# Patient Record
Sex: FEMALE | Race: ASIAN | NOT HISPANIC OR LATINO | URBAN - METROPOLITAN AREA
[De-identification: names, ages, dates, MRNs, and addresses within clinical notes are randomized per-mention and may not be internally consistent; named-entity substitution may affect disease eponyms.]

---

## 2024-08-10 ENCOUNTER — EMERGENCY (EMERGENCY)
Facility: HOSPITAL | Age: 29
LOS: 1 days | Discharge: HOME | End: 2024-08-10
Attending: EMERGENCY MEDICINE | Admitting: EMERGENCY MEDICINE
Payer: COMMERCIAL

## 2024-08-10 VITALS
TEMPERATURE: 98 F | RESPIRATION RATE: 18 BRPM | DIASTOLIC BLOOD PRESSURE: 68 MMHG | SYSTOLIC BLOOD PRESSURE: 105 MMHG | HEART RATE: 88 BPM | HEIGHT: 66 IN | WEIGHT: 116.84 LBS | OXYGEN SATURATION: 100 %

## 2024-08-10 PROCEDURE — 99284 EMERGENCY DEPT VISIT MOD MDM: CPT

## 2024-08-10 NOTE — ED ADULT TRIAGE NOTE - CHIEF COMPLAINT QUOTE
c/o itchy rash on face spreading to neck and chest x 1 day. Denies new detergent or body/face wash. No respiratory distress noted. Denies pmhx

## 2024-08-11 VITALS
HEART RATE: 68 BPM | RESPIRATION RATE: 18 BRPM | OXYGEN SATURATION: 100 % | DIASTOLIC BLOOD PRESSURE: 63 MMHG | TEMPERATURE: 98 F | SYSTOLIC BLOOD PRESSURE: 105 MMHG

## 2024-08-11 LAB
ALBUMIN SERPL ELPH-MCNC: 4.2 G/DL — SIGNIFICANT CHANGE UP (ref 3.3–5)
ALP SERPL-CCNC: 38 U/L — LOW (ref 40–120)
ALT FLD-CCNC: 13 U/L — SIGNIFICANT CHANGE UP (ref 4–33)
ANION GAP SERPL CALC-SCNC: 12 MMOL/L — SIGNIFICANT CHANGE UP (ref 7–14)
AST SERPL-CCNC: 15 U/L — SIGNIFICANT CHANGE UP (ref 4–32)
BASOPHILS # BLD AUTO: 0.06 K/UL — SIGNIFICANT CHANGE UP (ref 0–0.2)
BASOPHILS NFR BLD AUTO: 0.5 % — SIGNIFICANT CHANGE UP (ref 0–2)
BILIRUB SERPL-MCNC: 0.6 MG/DL — SIGNIFICANT CHANGE UP (ref 0.2–1.2)
BUN SERPL-MCNC: 12 MG/DL — SIGNIFICANT CHANGE UP (ref 7–23)
CALCIUM SERPL-MCNC: 8.9 MG/DL — SIGNIFICANT CHANGE UP (ref 8.4–10.5)
CHLORIDE SERPL-SCNC: 101 MMOL/L — SIGNIFICANT CHANGE UP (ref 98–107)
CO2 SERPL-SCNC: 25 MMOL/L — SIGNIFICANT CHANGE UP (ref 22–31)
CREAT SERPL-MCNC: 0.87 MG/DL — SIGNIFICANT CHANGE UP (ref 0.5–1.3)
CRP SERPL-MCNC: <3 MG/L — SIGNIFICANT CHANGE UP
EGFR: 92 ML/MIN/1.73M2 — SIGNIFICANT CHANGE UP
EOSINOPHIL # BLD AUTO: 1.08 K/UL — HIGH (ref 0–0.5)
EOSINOPHIL NFR BLD AUTO: 9.6 % — HIGH (ref 0–6)
GLUCOSE SERPL-MCNC: 103 MG/DL — HIGH (ref 70–99)
HCG SERPL-ACNC: <1 MIU/ML — SIGNIFICANT CHANGE UP
HCT VFR BLD CALC: 35.9 % — SIGNIFICANT CHANGE UP (ref 34.5–45)
HGB BLD-MCNC: 12.3 G/DL — SIGNIFICANT CHANGE UP (ref 11.5–15.5)
IANC: 6.7 K/UL — SIGNIFICANT CHANGE UP (ref 1.8–7.4)
IMM GRANULOCYTES NFR BLD AUTO: 0.4 % — SIGNIFICANT CHANGE UP (ref 0–0.9)
LYMPHOCYTES # BLD AUTO: 2.38 K/UL — SIGNIFICANT CHANGE UP (ref 1–3.3)
LYMPHOCYTES # BLD AUTO: 21.2 % — SIGNIFICANT CHANGE UP (ref 13–44)
MAGNESIUM SERPL-MCNC: 2 MG/DL — SIGNIFICANT CHANGE UP (ref 1.6–2.6)
MCHC RBC-ENTMCNC: 28.6 PG — SIGNIFICANT CHANGE UP (ref 27–34)
MCHC RBC-ENTMCNC: 34.3 GM/DL — SIGNIFICANT CHANGE UP (ref 32–36)
MCV RBC AUTO: 83.5 FL — SIGNIFICANT CHANGE UP (ref 80–100)
MONOCYTES # BLD AUTO: 0.95 K/UL — HIGH (ref 0–0.9)
MONOCYTES NFR BLD AUTO: 8.5 % — SIGNIFICANT CHANGE UP (ref 2–14)
NEUTROPHILS # BLD AUTO: 6.7 K/UL — SIGNIFICANT CHANGE UP (ref 1.8–7.4)
NEUTROPHILS NFR BLD AUTO: 59.8 % — SIGNIFICANT CHANGE UP (ref 43–77)
NRBC # BLD: 0 /100 WBCS — SIGNIFICANT CHANGE UP (ref 0–0)
NRBC # FLD: 0 K/UL — SIGNIFICANT CHANGE UP (ref 0–0)
PLATELET # BLD AUTO: 355 K/UL — SIGNIFICANT CHANGE UP (ref 150–400)
POTASSIUM SERPL-MCNC: 3.9 MMOL/L — SIGNIFICANT CHANGE UP (ref 3.5–5.3)
POTASSIUM SERPL-SCNC: 3.9 MMOL/L — SIGNIFICANT CHANGE UP (ref 3.5–5.3)
PROT SERPL-MCNC: 7.5 G/DL — SIGNIFICANT CHANGE UP (ref 6–8.3)
RBC # BLD: 4.3 M/UL — SIGNIFICANT CHANGE UP (ref 3.8–5.2)
RBC # FLD: 13.7 % — SIGNIFICANT CHANGE UP (ref 10.3–14.5)
SODIUM SERPL-SCNC: 138 MMOL/L — SIGNIFICANT CHANGE UP (ref 135–145)
WBC # BLD: 11.21 K/UL — HIGH (ref 3.8–10.5)
WBC # FLD AUTO: 11.21 K/UL — HIGH (ref 3.8–10.5)

## 2024-08-11 PROCEDURE — 70487 CT MAXILLOFACIAL W/DYE: CPT | Mod: 26,MC

## 2024-08-11 RX ORDER — FAMOTIDINE 40 MG/1
20 TABLET, FILM COATED ORAL ONCE
Refills: 0 | Status: COMPLETED | OUTPATIENT
Start: 2024-08-11 | End: 2024-08-11

## 2024-08-11 RX ORDER — DIPHENHYDRAMINE HCL 25 MG
25 CAPSULE ORAL ONCE
Refills: 0 | Status: COMPLETED | OUTPATIENT
Start: 2024-08-11 | End: 2024-08-11

## 2024-08-11 RX ORDER — AMPICILLIN SOD/SULBACTAM SOD 3 G
3 VIAL (EA) INJECTION ONCE
Refills: 0 | Status: COMPLETED | OUTPATIENT
Start: 2024-08-11 | End: 2024-08-11

## 2024-08-11 RX ADMIN — FAMOTIDINE 20 MILLIGRAM(S): 40 TABLET, FILM COATED ORAL at 01:26

## 2024-08-11 RX ADMIN — Medication 25 MILLIGRAM(S): at 01:26

## 2024-08-11 RX ADMIN — Medication 200 GRAM(S): at 01:07

## 2024-08-11 RX ADMIN — Medication 25 MILLIGRAM(S): at 03:39

## 2024-08-11 NOTE — ED PROVIDER NOTE - NSFOLLOWUPINSTRUCTIONS_ED_ALL_ED_FT
Today in the emergency department your evaluated for an infection versus an allergic reaction in your case.  We did a CT scan that did not show any significant infection.  You likely have an allergic reaction.  Please follow-up with an allergist should you continue to have symptoms.    Please follow up with your primary care physician within 1-2 weeks of discharge from the emergency department.  Please bring a copy of your results with you.  Please return to the emergency department for worsening of your symptoms.    You may take Acetaminophen over the counter as needed for pain and/or fever. Use as directed and see medication warnings.  You may take Ibuprofen over the counter as needed for pain and/or fever. Use as directed and see medication warnings.

## 2024-08-11 NOTE — ED PROVIDER NOTE - OBJECTIVE STATEMENT
29-year-old female without significant past medical history presenting with facial swelling, most significant in right eye over the last 24 hours.  Patient was seen at urgent care and given Benadryl without significant relief.  She is concerned that the swelling has gotten worse around her eyes.  She otherwise has no significant headache, chest pain, shortness of breath, N/V/D/abdominal pain, peripheral edema, fever/chills.  NKDA, +food allergies

## 2024-08-11 NOTE — ED PROVIDER NOTE - ATTENDING CONTRIBUTION TO CARE
Seen and examined, discussed with resident, 2-day history of facial redness and itching which began as a bump on forehead.  Initially Benadryl improved but since then has worsened and spread, now involving right eye more than left, spreading to itchy bumps on neck and left arm.  Patient seen in other ED and received oral Benadryl and steroid, continue to take at home but worsened tonight.  Has history of dust allergy and food allergy as a child.  No severe allergic reactions or EpiPen use in the past.  Has asthma history without current cough, chest, or SOB complaints.  No fever/chills, has some folliculitis and facial acne without significant previous infection complaints.  Raised reddened itchy wheals to face especially around right eye and inferior left eye, sclera clear, no discharge, EOMI, no diplopia, minimal pain with eye movement to right gaze, normal pharynx, clear lungs.

## 2024-08-11 NOTE — ED ADULT NURSE REASSESSMENT NOTE - NSFALLUNIVINTERV_ED_ALL_ED
Bed/Stretcher in lowest position, wheels locked, appropriate side rails in place/Call bell, personal items and telephone in reach/Instruct patient to call for assistance before getting out of bed/chair/stretcher/Non-slip footwear applied when patient is off stretcher/[place] to call system/Physically safe environment - no spills, clutter or unnecessary equipment/Purposeful proactive rounding/Room/bathroom lighting operational, light cord in reach

## 2024-08-11 NOTE — ED PROVIDER NOTE - CLINICAL SUMMARY MEDICAL DECISION MAKING FREE TEXT BOX
29-year-old female presenting with facial swelling, significant around right eye.  Concern for periorbital cellulitis versus allergic reaction.  Labs, EKG, CT max face ordered.

## 2024-08-11 NOTE — ED ADULT NURSE REASSESSMENT NOTE - NS ED NURSE REASSESS COMMENT FT1
Pt received from BREAK RN on stretcher, A/Ox4 answers all questions appropriately. Pt denies chest pain and SOB during reassessment, breathing is even and unlabored on room air. Abdomen is soft and non-distended. Pt ambulates independently at baseline, moves all four extremities on command, skin is intact. Pt resting comfortably at the bedside, No apparent acute visible distress noted on reassessment. Vital signs stable, NKA to medications. Pending CT results

## 2024-08-11 NOTE — ED PROVIDER NOTE - PROGRESS NOTE DETAILS
Lab work notable for WBC 11.21, otherwise unremarkable.  CT max face without evidence of periorbital cellulitis.  Dispo to home with PMD follow-up.

## 2024-08-11 NOTE — ED PROVIDER NOTE - PHYSICAL EXAMINATION
GENERAL: NAD  HEAD: normocephalic, atraumatic  HEENT: normal conjunctiva, oral mucosa moist  CARDIAC: well perfused  PULM: speaking in full sentences  GI: abdomen nondistended, soft, nontender  : no suprapubic tenderness  NEURO: moving all 4 extremities, Pain with EOMI, EOMI intact, PERRLA  MSK: no peripheral edema  SKIN: extremities warm, Facial swelling, erythematous rash across face, neck, bilateral UE,

## 2024-08-11 NOTE — ED PROVIDER NOTE - PATIENT PORTAL LINK FT
You can access the FollowMyHealth Patient Portal offered by [facility] by registering at the following website: [email or web address]. By joining [employer]’s FollowMyHealth portal, you will also be able to view your health information using other applications (apps) compatible with our system.

## 2024-08-11 NOTE — ED ADULT NURSE NOTE - OBJECTIVE STATEMENT
Break RN: 28yo female received in room 1. pt A&OX4, denies pmhx, c/o itchy rash and swelling on face spreading to neck x 1 day. No respiratory distress noted. 20G IV placed to [facility], lab drawn and sent. Side rails up, bed at lowest position, call bell within reach, patient oriented to the unit, safety maintained. Pending CT scan

## 2025-02-26 NOTE — ED ADULT TRIAGE NOTE - HEIGHT IN INCHES
Notified, understanding verbalized  ----- Message from Rodo Avendaño MD sent at 2/5/2025  6:31 PM CST -----  Let patient know:  Phosphorus is low again. Does he have any supplementation at home? If so, what does he have?    Uric acid just a little high. Can monitor    Vit d level low. Take 800-100 units Vit D daily.  \  Creatinine bumped a little. Increase fluid intake. Come by in 2-3 weeks for non fasting repeat.       6

## 2025-06-11 ENCOUNTER — EMERGENCY (EMERGENCY)
Facility: HOSPITAL | Age: 30
LOS: 1 days | End: 2025-06-11
Admitting: STUDENT IN AN ORGANIZED HEALTH CARE EDUCATION/TRAINING PROGRAM
Payer: COMMERCIAL

## 2025-06-11 VITALS
SYSTOLIC BLOOD PRESSURE: 120 MMHG | OXYGEN SATURATION: 99 % | HEART RATE: 74 BPM | DIASTOLIC BLOOD PRESSURE: 77 MMHG | TEMPERATURE: 98 F | WEIGHT: 111.99 LBS | RESPIRATION RATE: 16 BRPM

## 2025-06-11 VITALS
OXYGEN SATURATION: 100 % | DIASTOLIC BLOOD PRESSURE: 58 MMHG | TEMPERATURE: 98 F | HEART RATE: 77 BPM | SYSTOLIC BLOOD PRESSURE: 100 MMHG | RESPIRATION RATE: 18 BRPM

## 2025-06-11 LAB
ALBUMIN SERPL ELPH-MCNC: 4.6 G/DL — SIGNIFICANT CHANGE UP (ref 3.3–5)
ALP SERPL-CCNC: 32 U/L — LOW (ref 40–120)
ALT FLD-CCNC: 13 U/L — SIGNIFICANT CHANGE UP (ref 4–33)
ANION GAP SERPL CALC-SCNC: 10 MMOL/L — SIGNIFICANT CHANGE UP (ref 7–14)
APPEARANCE UR: CLEAR — SIGNIFICANT CHANGE UP
AST SERPL-CCNC: 16 U/L — SIGNIFICANT CHANGE UP (ref 4–32)
BASOPHILS # BLD AUTO: 0.07 K/UL — SIGNIFICANT CHANGE UP (ref 0–0.2)
BASOPHILS NFR BLD AUTO: 0.9 % — SIGNIFICANT CHANGE UP (ref 0–2)
BILIRUB SERPL-MCNC: 1.1 MG/DL — SIGNIFICANT CHANGE UP (ref 0.2–1.2)
BILIRUB UR-MCNC: NEGATIVE — SIGNIFICANT CHANGE UP
BLD GP AB SCN SERPL QL: NEGATIVE — SIGNIFICANT CHANGE UP
BUN SERPL-MCNC: 10 MG/DL — SIGNIFICANT CHANGE UP (ref 7–23)
CALCIUM SERPL-MCNC: 9.1 MG/DL — SIGNIFICANT CHANGE UP (ref 8.4–10.5)
CHLORIDE SERPL-SCNC: 102 MMOL/L — SIGNIFICANT CHANGE UP (ref 98–107)
CO2 SERPL-SCNC: 24 MMOL/L — SIGNIFICANT CHANGE UP (ref 22–31)
COLOR SPEC: YELLOW — SIGNIFICANT CHANGE UP
CREAT SERPL-MCNC: 0.53 MG/DL — SIGNIFICANT CHANGE UP (ref 0.5–1.3)
DIFF PNL FLD: NEGATIVE — SIGNIFICANT CHANGE UP
EGFR: 128 ML/MIN/1.73M2 — SIGNIFICANT CHANGE UP
EGFR: 128 ML/MIN/1.73M2 — SIGNIFICANT CHANGE UP
EOSINOPHIL # BLD AUTO: 0.14 K/UL — SIGNIFICANT CHANGE UP (ref 0–0.5)
EOSINOPHIL NFR BLD AUTO: 1.9 % — SIGNIFICANT CHANGE UP (ref 0–6)
GLUCOSE SERPL-MCNC: 94 MG/DL — SIGNIFICANT CHANGE UP (ref 70–99)
GLUCOSE UR QL: NEGATIVE MG/DL — SIGNIFICANT CHANGE UP
HCG SERPL-ACNC: 1002 MIU/ML — SIGNIFICANT CHANGE UP
HCT VFR BLD CALC: 37.4 % — SIGNIFICANT CHANGE UP (ref 34.5–45)
HGB BLD-MCNC: 12.3 G/DL — SIGNIFICANT CHANGE UP (ref 11.5–15.5)
IANC: 5.11 K/UL — SIGNIFICANT CHANGE UP (ref 1.8–7.4)
IMM GRANULOCYTES NFR BLD AUTO: 0.3 % — SIGNIFICANT CHANGE UP (ref 0–0.9)
KETONES UR QL: NEGATIVE MG/DL — SIGNIFICANT CHANGE UP
LEUKOCYTE ESTERASE UR-ACNC: NEGATIVE — SIGNIFICANT CHANGE UP
LYMPHOCYTES # BLD AUTO: 1.61 K/UL — SIGNIFICANT CHANGE UP (ref 1–3.3)
LYMPHOCYTES # BLD AUTO: 21.4 % — SIGNIFICANT CHANGE UP (ref 13–44)
MCHC RBC-ENTMCNC: 28.1 PG — SIGNIFICANT CHANGE UP (ref 27–34)
MCHC RBC-ENTMCNC: 32.9 G/DL — SIGNIFICANT CHANGE UP (ref 32–36)
MCV RBC AUTO: 85.4 FL — SIGNIFICANT CHANGE UP (ref 80–100)
MONOCYTES # BLD AUTO: 0.58 K/UL — SIGNIFICANT CHANGE UP (ref 0–0.9)
MONOCYTES NFR BLD AUTO: 7.7 % — SIGNIFICANT CHANGE UP (ref 2–14)
NEUTROPHILS # BLD AUTO: 5.11 K/UL — SIGNIFICANT CHANGE UP (ref 1.8–7.4)
NEUTROPHILS NFR BLD AUTO: 67.8 % — SIGNIFICANT CHANGE UP (ref 43–77)
NITRITE UR-MCNC: NEGATIVE — SIGNIFICANT CHANGE UP
NRBC # BLD AUTO: 0 K/UL — SIGNIFICANT CHANGE UP (ref 0–0)
NRBC # FLD: 0 K/UL — SIGNIFICANT CHANGE UP (ref 0–0)
NRBC BLD AUTO-RTO: 0 /100 WBCS — SIGNIFICANT CHANGE UP (ref 0–0)
PH UR: 6.5 — SIGNIFICANT CHANGE UP (ref 5–8)
PLATELET # BLD AUTO: 375 K/UL — SIGNIFICANT CHANGE UP (ref 150–400)
POTASSIUM SERPL-MCNC: 3.6 MMOL/L — SIGNIFICANT CHANGE UP (ref 3.5–5.3)
POTASSIUM SERPL-SCNC: 3.6 MMOL/L — SIGNIFICANT CHANGE UP (ref 3.5–5.3)
PROT SERPL-MCNC: 7.5 G/DL — SIGNIFICANT CHANGE UP (ref 6–8.3)
PROT UR-MCNC: NEGATIVE MG/DL — SIGNIFICANT CHANGE UP
RBC # BLD: 4.38 M/UL — SIGNIFICANT CHANGE UP (ref 3.8–5.2)
RBC # FLD: 14 % — SIGNIFICANT CHANGE UP (ref 10.3–14.5)
RH IG SCN BLD-IMP: POSITIVE — SIGNIFICANT CHANGE UP
SODIUM SERPL-SCNC: 136 MMOL/L — SIGNIFICANT CHANGE UP (ref 135–145)
SP GR SPEC: 1.01 — SIGNIFICANT CHANGE UP (ref 1–1.03)
UROBILINOGEN FLD QL: 0.2 MG/DL — SIGNIFICANT CHANGE UP (ref 0.2–1)
WBC # BLD: 7.53 K/UL — SIGNIFICANT CHANGE UP (ref 3.8–10.5)
WBC # FLD AUTO: 7.53 K/UL — SIGNIFICANT CHANGE UP (ref 3.8–10.5)

## 2025-06-11 PROCEDURE — 76817 TRANSVAGINAL US OBSTETRIC: CPT | Mod: 26

## 2025-06-11 PROCEDURE — 99285 EMERGENCY DEPT VISIT HI MDM: CPT

## 2025-06-11 NOTE — ED PROVIDER NOTE - OBJECTIVE STATEMENT
30yoF , LMP , p/w R sided pelvic pain, sent by her OBGYN for possible ectopic pregnancy. beta hcg slowly rising, last level 900, had US today showing fluid filled sac near R ovary, possible cyst vs ectopic. no fevers, dysuria, vaginal bleeding. does endorse mild yellow discharge. no itching monogamous with . no cp, sob.

## 2025-06-11 NOTE — ED PROVIDER NOTE - PHYSICAL EXAMINATION
CONSTITUTIONAL: Well-appearing; well-nourished; in no apparent distress;  HEAD: Normocephalic, atraumatic;  NECK/LYMPH: Supple  CARD: Normal S1, S2; no murmurs, rubs, or gallops noted  RESP: Normal chest excursion with respiration; breath sounds clear and equal bilaterally; no wheezes, rhonchi, or rales noted  ABD/GI: soft, non-distended; non-tender; no palpable organomegaly, no pulsatile mass  PELVIC: performed via speculum exam with Female Chaperone ARMOND Nicholson. no external vulvar lesions/rashes or abnormalities. cervical os closed. scant yellow/clear discharge. Bimanual exam: negative CMT, R sided adnexal ttp.  EXT/MS: moves all extremities; distal pulses are normal, no pedal edema  SKIN: Normal for age and race; warm; dry; good turgor; no apparent lesions or exudate noted  NEURO: Awake, alert, oriented x 3, no gross deficits, CN II-XII grossly intact, no motor or sensory deficit noted  PSYCH: Normal mood; appropriate affect

## 2025-06-11 NOTE — ED ADULT NURSE NOTE - NSFALLUNIVINTERV_ED_ALL_ED
Bed/Stretcher in lowest position, wheels locked, appropriate side rails in place/Call bell, personal items and telephone in reach/Instruct patient to call for assistance before getting out of bed/chair/stretcher/Non-slip footwear applied when patient is off stretcher/[place] to call system/Physically safe environment - no spills, clutter or unnecessary equipment/Purposeful proactive rounding/Room/bathroom lighting operational, light cord in reach

## 2025-06-11 NOTE — ED PROVIDER NOTE - PATIENT PORTAL LINK FT
You can access the FollowMyHealth Patient Portal offered by [facility] by registering at the following website: [email or web address]. By joining [employer]’s FollowMyHealth portal, you will also be able to view your health information using other applications (apps) compatible with our system.

## 2025-06-11 NOTE — CONSULT NOTE ADULT - ASSESSMENT
30y  at 5w5d (LMP 25) with PUL. Hemodynamically stable with nontender abdominal exam.     bHCG trend: 21 () -> 240 () -> 903 () at outpatient [facility] lab. 1002 () today in ED.   Unable to properly assess bHCG values between [facility] lab and [facility] lab given a possible difference in IRP.    Therefore, differential diagnoses include early viable IUP vs. ectopic pregnancy vs. failed pregnancy. Diagnosis unclear at this time.      - Given that patient is hemodynamically stable, has nontender abdominal exam, and that pregnancy is highly desired, it is reasonable to expectantly manage at this time. Patient to follow up in 48 hours for repeat b-HCG with private OBGYN.   - Ectopic precautions reviewed with patient.  Discussed with patient importance of follow up for b-HCG given unknown pregnancy location at this time.  Patient expressed understanding.  All questions and concerns addressed to patient's apparent satisfaction.   - Patient to be added to GYN b-HCG list for closer follow up.    - Patient stable for d/c home from GYN perspective.  Primary management per ED team.      Stephanie Crain, PGY-2  Evaluated with Attending Dr. Dacosta

## 2025-06-11 NOTE — ED PROVIDER NOTE - CLINICAL SUMMARY MEDICAL DECISION MAKING FREE TEXT BOX
30yoF , LMP , p/w R sided pelvic pain, sent by her OBGYN for possible ectopic pregnancy. beta hcg slowly rising, last level 900, had US today showing fluid filled sac near R ovary, possible cyst vs ectopic. no fevers, dysuria, vaginal bleeding. does endorse mild yellow discharge. no itching monogamous with . no cp, sob.    will obtain labs, type and screen, hcg, us  reassess

## 2025-06-11 NOTE — CONSULT NOTE ADULT - SUBJECTIVE AND OBJECTIVE BOX
R2 GYNECOLOGY CONSULT NOTE    HPI    30y  at 5w5d (LMP 25) sent in from her OB's office due to concern for ectopic pregnancy. Patient has been followed for PUL. bHCG trend as below. This is a highly desired pregnancy.    States that she has been having intermittent lower abdominal pain for the past few weeks, described as sharp and rated 8/10 at its worst. Pain is described to be "across lower abdomen" and radiating to her back. 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 States that her episodes of pain spontaneously resolve after 5-10 minutes. Does not take any pain medications for relief. Currently rates pain 5-6/10 in the ED.     bHC () -> 240 () -> 903 ()    Reports mild nausea. Denies vomiting.   Denies vaginal bleeding, vaginal discharge/irritation, lightheadedness, dizziness, chest pain, SOB, palpitations    OBGYN: Dr. Mariana Iglesias    – OBHx:   – GynHx: Severe menstrual cramps (suspected endometriosis). Fibroids. Denies prior diagnoses of ovarian cysts, Hx of abnormal pap smears, Hx of STIs  – PMH: Childhood asthma. Denies HTN, DM, cardiac disease, thyroid disorders, Hx of blood clots, bleeding disorders  – PSH: Denies  – Social: denies EtOH, smoking, recreational drug use  – Meds: PNV  – Allergies: NKDA    Objective  – VS  T(C): 36.7 (25 @ 11:52)  HR: 74 (25 @ 11:52)  BP: 120/77 (25 @ 11:52)  RR: 16 (25 @ 11:52)  SpO2: 99% (25 @ 11:52)  – PE:   General: Resting comfortably, NAD  CV: Well perfused  Pulm: Nonlabored breathing. Speaking in full sentences.  Abd: Soft, nondistended. Nontender. No rebound or guarding.  Extr: No lower extremity swelling.    LABS:                        12.3   7.53  )-----------( 375      ( 2025 13:38 )             37.4         136  |  102  |  10  ----------------------------<  94  3.6   |  24  |  0.53    Ca    9.1      2025 13:38    TPro  7.5  /  Alb  4.6  /  TBili  1.1  /  DBili  x   /  AST  16  /  ALT  13  /  AlkPhos  32[L]  06-11      Urinalysis Basic - ( 2025 13:38 )    Color: Yellow / Appearance: Clear / S.007 / pH: x  Gluc: 94 mg/dL / Ketone: x  / Bili: Negative / Urobili: 0.2 mg/dL   Blood: x / Protein: Negative mg/dL / Nitrite: Negative   Leuk Esterase: Negative / RBC: x / WBC x   Sq Epi: x / Non Sq Epi: x / Bacteria: x        Blood Type: AB Positive      RADIOLOGY & ADDITIONAL STUDIES:  ACC: [id number] EXAM:  US OB TRANSVAGINAL   ORDERED BY: ASYA OTT     PROCEDURE DATE:  2025          INTERPRETATION:  CLINICAL INFORMATION: Pelvic pain.    LMP: 2025    COMPARISON: None available.    TECHNIQUE: Endovaginal pelvic sonogram only.    FINDINGS:  Uterus: Retroverted uterus. Subserosal myoma measuring 1.6 x 1.7 x 1.7 cm   and 1.7 x 1.4 x 1.9 cm.    Gestational Sac Size (mean): 3.4 mm  Gestational Sac Shape : Normal.  Crown Rump Length: N/A  Estimated Gestational Age: 4 weeks 6 days  Yolk Sac: Not visualized  Fetal Heart Rate: N/A    Right ovary: 3.6 cm x 1.8 cm x 1.6 cm. Within normal limits.  Left ovary: 4.1 cm x 1.1 cm x 2.4 cm. Within normal limits.    Fluid: None.    IMPRESSION:  *  Nonspecific intrauterine fluid collection, without evidence of yolk   sac or fetal pole likely early intrauterine gestation. Follow-up serial   serum beta-hCG and repeat ultrasound is recommended.  *  Estimated gestational age of 4 weeks 6 days            --- End of Report ---            HONG ROSS MD; Attending Radiologist  This document has been electronically signed. 2025  4:06PM

## 2025-06-11 NOTE — ED ADULT TRIAGE NOTE - CHIEF COMPLAINT QUOTE
LMP 5/2, approx 5 weeks pregnant, sent in from OBGYN to r/o ectopic vs cyst- endorsing R side pelvic pain- denies medical hx

## 2025-06-11 NOTE — ED PROVIDER NOTE - PROGRESS NOTE DETAILS
patient seen and cleared by OBGYN, can f/u outpatient with her OBGYN in 48 hours or may return to our Emergency department in 48 hours for repeat beta hcg.

## 2025-06-11 NOTE — ED ADULT NURSE NOTE - OBJECTIVE STATEMENT
Pt. presents  LMP 25 not given an ANALIA presenting to wellness to r/o ectopic pregnancy v. ovarian cyst. Pt. endorses being seen at her clinic and being told she has an ectopic pregnancy. pt. endorsing generalized lower abdominal pain R>L w/o vaginal bleeding. NAD noted at this time. respirations even and unlabored on RA. 20g IV placed in the R AC. labs drawn and sent. comfort measures provided. safety precautions maintained.

## 2025-06-11 NOTE — ED PROVIDER NOTE - NSFOLLOWUPINSTRUCTIONS_ED_ALL_ED_FT
You were seen in our department for Pelvic pain during pregnancy  Follow up with your PMD in 48-72 hours for further monitoring.  Follow up with OBGYN in 48 hours for repeat Beta hcg- if you are unable to follow up with your OBGYN follow up with our emergency department for repeat testing.  If you have any difficulty obtaining an appointment with a consultant/specialist, please call [phone] to speak with a discharge coordinator for assistance.   if you develop any chest pain, dizziness, high fevers, weakness, numbness, tingling, vision changes, or any worsening symptoms return to our ED for evaluation.

## 2025-06-13 ENCOUNTER — EMERGENCY (EMERGENCY)
Facility: HOSPITAL | Age: 30
LOS: 1 days | End: 2025-06-13
Attending: EMERGENCY MEDICINE | Admitting: EMERGENCY MEDICINE
Payer: COMMERCIAL

## 2025-06-13 VITALS
DIASTOLIC BLOOD PRESSURE: 72 MMHG | TEMPERATURE: 98 F | SYSTOLIC BLOOD PRESSURE: 118 MMHG | RESPIRATION RATE: 18 BRPM | WEIGHT: 111.99 LBS | OXYGEN SATURATION: 100 % | HEIGHT: 66.54 IN | HEART RATE: 79 BPM

## 2025-06-13 PROBLEM — Z78.9 OTHER SPECIFIED HEALTH STATUS: Chronic | Status: ACTIVE | Noted: 2025-06-11

## 2025-06-13 LAB
ALBUMIN SERPL ELPH-MCNC: 4.4 G/DL — SIGNIFICANT CHANGE UP (ref 3.3–5)
ALP SERPL-CCNC: 34 U/L — LOW (ref 40–120)
ALT FLD-CCNC: 15 U/L — SIGNIFICANT CHANGE UP (ref 4–33)
ANION GAP SERPL CALC-SCNC: 14 MMOL/L — SIGNIFICANT CHANGE UP (ref 7–14)
AST SERPL-CCNC: 15 U/L — SIGNIFICANT CHANGE UP (ref 4–32)
BASOPHILS # BLD AUTO: 0.06 K/UL — SIGNIFICANT CHANGE UP (ref 0–0.2)
BASOPHILS NFR BLD AUTO: 1 % — SIGNIFICANT CHANGE UP (ref 0–2)
BILIRUB SERPL-MCNC: 0.7 MG/DL — SIGNIFICANT CHANGE UP (ref 0.2–1.2)
BUN SERPL-MCNC: 11 MG/DL — SIGNIFICANT CHANGE UP (ref 7–23)
CALCIUM SERPL-MCNC: 9.4 MG/DL — SIGNIFICANT CHANGE UP (ref 8.4–10.5)
CHLORIDE SERPL-SCNC: 102 MMOL/L — SIGNIFICANT CHANGE UP (ref 98–107)
CO2 SERPL-SCNC: 22 MMOL/L — SIGNIFICANT CHANGE UP (ref 22–31)
CREAT SERPL-MCNC: 0.62 MG/DL — SIGNIFICANT CHANGE UP (ref 0.5–1.3)
EGFR: 123 ML/MIN/1.73M2 — SIGNIFICANT CHANGE UP
EGFR: 123 ML/MIN/1.73M2 — SIGNIFICANT CHANGE UP
EOSINOPHIL # BLD AUTO: 0.16 K/UL — SIGNIFICANT CHANGE UP (ref 0–0.5)
EOSINOPHIL NFR BLD AUTO: 2.6 % — SIGNIFICANT CHANGE UP (ref 0–6)
GLUCOSE SERPL-MCNC: 93 MG/DL — SIGNIFICANT CHANGE UP (ref 70–99)
HCG SERPL-ACNC: 1212 MIU/ML — SIGNIFICANT CHANGE UP
HCT VFR BLD CALC: 37.1 % — SIGNIFICANT CHANGE UP (ref 34.5–45)
HGB BLD-MCNC: 12.5 G/DL — SIGNIFICANT CHANGE UP (ref 11.5–15.5)
IANC: 3.52 K/UL — SIGNIFICANT CHANGE UP (ref 1.8–7.4)
IMM GRANULOCYTES NFR BLD AUTO: 0.3 % — SIGNIFICANT CHANGE UP (ref 0–0.9)
LYMPHOCYTES # BLD AUTO: 1.81 K/UL — SIGNIFICANT CHANGE UP (ref 1–3.3)
LYMPHOCYTES # BLD AUTO: 29.7 % — SIGNIFICANT CHANGE UP (ref 13–44)
MCHC RBC-ENTMCNC: 28 PG — SIGNIFICANT CHANGE UP (ref 27–34)
MCHC RBC-ENTMCNC: 33.7 G/DL — SIGNIFICANT CHANGE UP (ref 32–36)
MCV RBC AUTO: 83 FL — SIGNIFICANT CHANGE UP (ref 80–100)
MONOCYTES # BLD AUTO: 0.52 K/UL — SIGNIFICANT CHANGE UP (ref 0–0.9)
MONOCYTES NFR BLD AUTO: 8.5 % — SIGNIFICANT CHANGE UP (ref 2–14)
NEUTROPHILS # BLD AUTO: 3.52 K/UL — SIGNIFICANT CHANGE UP (ref 1.8–7.4)
NEUTROPHILS NFR BLD AUTO: 57.9 % — SIGNIFICANT CHANGE UP (ref 43–77)
NRBC # BLD AUTO: 0 K/UL — SIGNIFICANT CHANGE UP (ref 0–0)
NRBC # FLD: 0 K/UL — SIGNIFICANT CHANGE UP (ref 0–0)
NRBC BLD AUTO-RTO: 0 /100 WBCS — SIGNIFICANT CHANGE UP (ref 0–0)
PLATELET # BLD AUTO: 370 K/UL — SIGNIFICANT CHANGE UP (ref 150–400)
POTASSIUM SERPL-MCNC: 4 MMOL/L — SIGNIFICANT CHANGE UP (ref 3.5–5.3)
POTASSIUM SERPL-SCNC: 4 MMOL/L — SIGNIFICANT CHANGE UP (ref 3.5–5.3)
PROT SERPL-MCNC: 7.5 G/DL — SIGNIFICANT CHANGE UP (ref 6–8.3)
RBC # BLD: 4.47 M/UL — SIGNIFICANT CHANGE UP (ref 3.8–5.2)
RBC # FLD: 13.8 % — SIGNIFICANT CHANGE UP (ref 10.3–14.5)
SODIUM SERPL-SCNC: 138 MMOL/L — SIGNIFICANT CHANGE UP (ref 135–145)
WBC # BLD: 6.09 K/UL — SIGNIFICANT CHANGE UP (ref 3.8–10.5)
WBC # FLD AUTO: 6.09 K/UL — SIGNIFICANT CHANGE UP (ref 3.8–10.5)

## 2025-06-13 PROCEDURE — 76817 TRANSVAGINAL US OBSTETRIC: CPT | Mod: 26

## 2025-06-13 PROCEDURE — 99285 EMERGENCY DEPT VISIT HI MDM: CPT

## 2025-06-13 NOTE — ED ADULT TRIAGE NOTE - WEIGHT METHOD
stated Mauc Instructions: By selecting yes to the question below the MAUC number will be added into the note.  This will be calculated automatically based on the diagnosis chosen, the size entered, the body zone selected (H,M,L) and the specific indications you chose. You will also have the option to override the Mohs AUC if you disagree with the automatically calculated number and this option is found in the Case Summary tab.

## 2025-06-13 NOTE — CHART NOTE - NSCHARTNOTEFT_GEN_A_CORE
Chart reviewed with GYN team. GYN service attending Dr. Estrada spoke with patients private OBGYN, Dr. Mariana Bahena, who states she is aware of current care plan and patient intends to follow up in her office. Repeat bHCG ordered from office. Patient to be removed from beta list.    BAKARI Agudelo  d/w Dr. Estrada

## 2025-06-13 NOTE — ED ADULT TRIAGE NOTE - PAIN RATING/NUMBER SCALE (0-10): ACTIVITY
The history is provided by the patient and a relative. Diarrhea  Quality:  Watery  Severity:  Severe  Number of episodes:  4 diarrhea stools today loose aand watery  Duration:  4 days  Timing:  Constant  Progression:  Worsening  Relieved by:  Nothing  Worsened by:  Nothing  Ineffective treatments:  Liquids  Associated symptoms: abdominal pain and myalgias    Associated symptoms: no arthralgias, no chills, no recent cough, no diaphoresis, no fever, no headaches, no URI and no vomiting    Abdominal pain:     Location:  Generalized    Quality: bloating and cramping    Myalgias:     Location:  Generalized    Quality:  Aching    Severity:  Moderate  Risk factors: no recent antibiotic use, no sick contacts, no suspicious food intake and no travel to endemic areas        Review of Systems   Constitutional: Negative. Negative for chills, diaphoresis and fever. HENT: Negative. Eyes: Negative. Respiratory: Negative. Cardiovascular: Negative. Gastrointestinal: Positive for abdominal pain and diarrhea. Negative for vomiting. Genitourinary: Negative. Musculoskeletal: Positive for myalgias. Negative for arthralgias. Skin: Negative. Neurological: Negative. Negative for headaches. All other systems reviewed and are negative. Family History   Problem Relation Age of Onset    Cancer Mother     Cancer Brother     Cancer Other      Social History     Socioeconomic History    Marital status:       Spouse name: Not on file    Number of children: Not on file    Years of education: Not on file    Highest education level: Not on file   Occupational History    Not on file   Tobacco Use    Smoking status: Never Smoker    Smokeless tobacco: Never Used   Vaping Use    Vaping Use: Never used   Substance and Sexual Activity    Alcohol use: No    Drug use: No    Sexual activity: Never   Other Topics Concern    Not on file   Social History Narrative    Not on file     Social Determinants of previous available. Saline lock and she was given IVF bolus followed by a drip at 150cc/hr 0.9NS. She was also given KCL IV. Her urine has no ketones but she does appear clinically volume depleted. Due to her depleted potassium she will need admission. Her CT does not have acute pathology. I did not use contrast due to her elevated Cr. The lactic acid is most likely secondary to volume contraction and I suspect will correct rapidly. I have contacted hospitalist for admission. Patient had no diarrhea in ER for stool sample or C diff  My typical dicussion, presentation,and considerations for this patients' chief complaint, diagnosis, and differential diagnosis have been considered and discussed. Final Impression    1. Diarrhea, unspecified type    2. Hypokalemia    3. Nausea    4.  Fatigue, unspecified type              287 Cedrick Christensen DO  06/24/21 [id number] 3 (mild pain)

## 2025-06-13 NOTE — ED PROVIDER NOTE - CLINICAL SUMMARY MEDICAL DECISION MAKING FREE TEXT BOX
30-year-old female with abnormally rising beta concerning for ectopic pregnancy.  Will repeat hormone level today and get transvaginal ultrasound.  Will call GYN consult as needed.

## 2025-06-13 NOTE — ED ADULT NURSE NOTE - OBJECTIVE STATEMENT
Pt received to intake room 1  a&ox4, ambulatory, on room air coming to ED. Patient is  and is 5 weeks pregnant. Patient endorses that's he was seen by her OB on  for abd pain, had an US done and was told that she may have cysts on her ovaries. patient endorses that she was then seen 2 days ago in the ED for pelvic pain and was d/c'd to follow up with her OB. patient endorses that's he followed up with her OB today and was told to go to the ED because her levels are not risin and she may be having an ectopic pregnancy.  patient denies any vaginal bleeding or camping.. Pt denies any pmhx . Pt respirations even and unlabored, chest rise and fall equal b/l. Pt denies chest pain, HA, SOB, dizziness, N/V/D, fever/chills. #20g placed to L AC, labs collected and sent. Pt pending US. Stretcher in lowest position, call bell within reach, pt safety maintained.

## 2025-06-13 NOTE — ED PROVIDER NOTE - PATIENT PORTAL LINK FT
You can access the FollowMyHealth Patient Portal offered by [facility] by registering at the following website: [email or web address]. By joining [employer]’s FollowMyHealth portal, you will also be able to view your health information using other applications (apps) compatible with our system.

## 2025-06-13 NOTE — ED PROVIDER NOTE - NSFOLLOWUPINSTRUCTIONS_ED_ALL_ED_FT
Return in 2 days for repeat blood work/US on 6/16/25 or sooner.   Return if you have pain, if you have vaginal bleeding or if vaginal bleeding returns, is continuous or worsens, if you feel weak, have vomiting, any concerns. Take medications as instructed if they were prescribed. See your GYN as soon as possible (1-2 days). If you need assistance with follow up appointment, you can contact our Care Coordinator at [phone].  In addition the Women's Health clinic is located at [address] [place] [address], tel [phone].

## 2025-06-13 NOTE — ED PROVIDER NOTE - PROGRESS NOTE DETAILS
Hayes RAMIREZ: Received sign out from Dr. Ziegler.  Patient is refusing methotrexate at this time and adamantly requesting to be discharged.  Patient states will return  in 6/16/25  for reassessment and repeat hCG.  Patient denies current abdominal pain or vaginal bleeding.  Pt is well appearing, has no new complaints and able to walk with normal gait. Pt is stable for discharge and follow up with medical doctor. Pt educated on care and need for follow up. Discussed anticipatory guidance and return precautions. Questions answered. I had a detailed discussion with the patient regarding the historical points, exam findings, and any diagnostic results supporting the discharge diagnosis.

## 2025-06-13 NOTE — ED PROVIDER NOTE - OBJECTIVE STATEMENT
30-year-old female with no significant medical history G1, P0 at 5 weeks 1 day gestation by dates.  Patient presents to the ED for rule out ectopic.  Patient had an ultrasound 2 days ago and a beta of 1000 at that time.  No IUP was noted.  Today patient had blood work that showed beta only alisia to 1400 but did not have repeat ultrasound.  Patient had some pain early on in the pregnancy but today and yesterday has had no abdominal pain, nausea vomiting or bleeding.  Patient was told to come to the ED based on abnormal beta.

## 2025-06-13 NOTE — ED ADULT TRIAGE NOTE - CHIEF COMPLAINT QUOTE
Pt , approximately 5 weeks pregnant, states that her MD sent her to the ED to rule out an ectopic pregnancy. LMP 25. Denies abdominal cramping, vaginal bleeding, n/v/d. Denies past medical history.

## 2025-06-13 NOTE — ED PROVIDER NOTE - NSFOLLOWUPCLINICS_GEN_ALL_ED_FT
Jovana VARGAS  [facility]  [address] [place] [address]  Phone: [phone]  Fax: [phone]

## 2025-06-14 NOTE — CONSULT NOTE ADULT - ASSESSMENT
Assessment: 30y  at 6w0d (LMP 25) sent in from OB office to rule out ectopic pregnancy. Patient with inappropriate bHCG trend TVUS showing  uterus: Intrauterine cystic focus with mean diameter of 0.3 cm, grossly unchanged, RO with 1.6cm corpus lutem wnl, LO not visualized (previously wnl), no FF. Likely failed IUP give US findings. Given improve pain, lack of TVUS findings no FF, hemodynamically stability and intrauterine gestation sac, lower suspicion for ectopic pregnancy, however cannot rule out at this time. Discussed with patient low likelihood of viable IUP given findings.     Recommendations:   - Extensive discussion with patient regarding options for management MTX vs surgery vs expectant management given continued PUL with inappropriate bHCG trend   - Patient expressed desire for expectant management as this is a highly desired pregnancy   - At this time she declined MTX or surgical intervention  - Patient for repeat bHCG in 48hrs in ED due to personal gyn being out of town   - STRICT return precautions review, if pain, lightheaded, dizziness, SOB, significant vaginal bleeding   - Remainder of care per ED     D/w Dr. Corey Rooney, PGY2     **Documentation Delayed Due to Clinical Duty**  Patient at time of initial evaluation requesting time to speak with family regarding management, reassessed

## 2025-06-14 NOTE — CONSULT NOTE ADULT - SUBJECTIVE AND OBJECTIVE BOX
ELOINA WISDOM  30y  Female [id number]    HPI: 30y  at 6w0d (LMP 25) sent in from OB office to rule out ectopic pregnancy. Patient with inappropriate bHCG trend. She notes improvement of pain noted at prior ED presentation, with minimal intermittent pain. No pain at time of evaluation. Patient denies N/V, fevers, chills, SOB, CP, dizziness. She emphasized  this is a highly desired pregnancy. She denies vaginal bleeding or discharge. Patient noted outpatient imaging with concern for cyst in R ovary on , not re-demonstrated on repeat imaging in the ED.     BHCG Trend per patient and chart: bHCG trend: 21 () -> 240 () -> 903 () at outpatient [facility] lab. 1002 ()-> 1416 ()- OSH->1212 ()-[facility]    OBGYN: Dr. Mariana Iglesias    – OBHx:   – GynHx: Severe menstrual cramps (suspected endometriosis). Fibroids. Denies prior diagnoses of ovarian cysts, Hx of abnormal pap smears, Hx of STIs  – PMH: Childhood asthma. Denies HTN, DM, cardiac disease, thyroid disorders, Hx of blood clots, bleeding disorders  – PSH: Denies  – Social: denies EtOH, smoking, recreational drug use  – Meds: PNV  – Allergies: NKDA      Vital Signs Last 24 Hrs  T(C): 36.7 (2025 20:07), Max: 36.7 (2025 20:07)  T(F): 98.1 (2025 20:07), Max: 98.1 (2025 20:07)  HR: 79 (2025 20:07) (79 - 79)  BP: 118/72 (2025 20:07) (118/72 - 118/72)  BP(mean): --  RR: 18 (2025 20:07) (18 - 18)  SpO2: 100% (2025 20:07) (100% - 100%)    Parameters below as of 2025 20:07  Patient On (Oxygen Delivery Method): room air        Physical Exam:   General: sitting comfortably in bed, NAD, ambulated without issue   Lungs: non labored breathing on RA   Abd: Soft, non-distended, minimal tenderness to deep palpation in RLQ    :  External labia wnl. Bimanual exam with no cervical motion tenderness, uterus wnl, adnexa non palpable b/l,   Speculum Exam: Deferred per request of patient with no blood or irregular discharge noted on glove at time of exam  Ext: non-tender b/l, no edema     Chaperone declined by patient     LABS:                              12.5   6.09  )-----------( 370      ( 2025 22:21 )             37.1         138  |  102  |  11  ----------------------------<  93  4.0   |  22  |  0.62    Ca    9.4      2025 22:21    TPro  7.5  /  Alb  4.4  /  TBili  0.7  /  DBili  x   /  AST  15  /  ALT  15  /  AlkPhos  34[L]      I&O's Detail      Urinalysis Basic - ( 2025 22:21 )    Color: x / Appearance: x / SG: x / pH: x  Gluc: 93 mg/dL / Ketone: x  / Bili: x / Urobili: x   Blood: x / Protein: x / Nitrite: x   Leuk Esterase: x / RBC: x / WBC x   Sq Epi: x / Non Sq Epi: x / Bacteria: x        RADIOLOGY & ADDITIONAL STUDIES:    < from: US Transvaginal, OB (25 @ 22:53) >    ACC: [id number] EXAM:  US OB TRANSVAGINAL   ORDERED BY: DELFIN CARRILLO     PROCEDURE DATE:  2025          INTERPRETATION:  CLINICAL INFORMATION: Pregnant. Beta-hCG of 1212,   previously 1002 on 2025.    LMP: 2025    Estimated Gestational Age by LMP: 6 weeks and 0 days    COMPARISON: Pelvic sonogram of 2025.    TECHNIQUE: Endovaginal and transabdominal pelvic sonogram.    FINDINGS:  Uterus: Intrauterine cystic focus with mean diameter of 0.3 cm, grossly   unchanged.    Right ovary: 3.5 cm x 1.5 cm x 2.0 cm, inclusive of a 1.6 cm corpus   luteum. Within normal limits. Normal arterial and venous waveforms.  Left ovary: Not visualized.    Fluid: None.    IMPRESSION:  Fluid collection in the uterus with MSD of <16 mm, which may represent an   early normal IUP, pregnancy failure or a pseudosac with ectopic   pregnancy. Serial hCG and ultrasound are recommended to determine the   significance of these findings.          --- End of Report ---            EL OCAMPO MD; Attending Radiologist  This document has been electronically signed. 2025 12:24AM    < end of copied text >

## 2025-06-16 ENCOUNTER — EMERGENCY (EMERGENCY)
Facility: HOSPITAL | Age: 30
LOS: 1 days | End: 2025-06-16
Attending: STUDENT IN AN ORGANIZED HEALTH CARE EDUCATION/TRAINING PROGRAM | Admitting: STUDENT IN AN ORGANIZED HEALTH CARE EDUCATION/TRAINING PROGRAM
Payer: COMMERCIAL

## 2025-06-16 VITALS
RESPIRATION RATE: 18 BRPM | HEART RATE: 100 BPM | HEIGHT: 66.54 IN | OXYGEN SATURATION: 99 % | SYSTOLIC BLOOD PRESSURE: 125 MMHG | TEMPERATURE: 98 F | WEIGHT: 113.98 LBS | DIASTOLIC BLOOD PRESSURE: 82 MMHG

## 2025-06-16 VITALS
RESPIRATION RATE: 16 BRPM | TEMPERATURE: 98 F | HEART RATE: 76 BPM | DIASTOLIC BLOOD PRESSURE: 51 MMHG | OXYGEN SATURATION: 100 % | SYSTOLIC BLOOD PRESSURE: 107 MMHG

## 2025-06-16 LAB
ALBUMIN SERPL ELPH-MCNC: 4.2 G/DL — SIGNIFICANT CHANGE UP (ref 3.3–5)
ALP SERPL-CCNC: 29 U/L — LOW (ref 40–120)
ALT FLD-CCNC: 16 U/L — SIGNIFICANT CHANGE UP (ref 4–33)
ANION GAP SERPL CALC-SCNC: 13 MMOL/L — SIGNIFICANT CHANGE UP (ref 7–14)
APPEARANCE UR: CLEAR — SIGNIFICANT CHANGE UP
AST SERPL-CCNC: 16 U/L — SIGNIFICANT CHANGE UP (ref 4–32)
BASOPHILS # BLD AUTO: 0.06 K/UL — SIGNIFICANT CHANGE UP (ref 0–0.2)
BASOPHILS NFR BLD AUTO: 0.6 % — SIGNIFICANT CHANGE UP (ref 0–2)
BILIRUB SERPL-MCNC: 1.6 MG/DL — HIGH (ref 0.2–1.2)
BILIRUB UR-MCNC: NEGATIVE — SIGNIFICANT CHANGE UP
BUN SERPL-MCNC: 9 MG/DL — SIGNIFICANT CHANGE UP (ref 7–23)
CALCIUM SERPL-MCNC: 8.5 MG/DL — SIGNIFICANT CHANGE UP (ref 8.4–10.5)
CHLORIDE SERPL-SCNC: 104 MMOL/L — SIGNIFICANT CHANGE UP (ref 98–107)
CO2 SERPL-SCNC: 21 MMOL/L — LOW (ref 22–31)
COLOR SPEC: YELLOW — SIGNIFICANT CHANGE UP
CREAT SERPL-MCNC: 0.53 MG/DL — SIGNIFICANT CHANGE UP (ref 0.5–1.3)
DIFF PNL FLD: ABNORMAL
EGFR: 128 ML/MIN/1.73M2 — SIGNIFICANT CHANGE UP
EGFR: 128 ML/MIN/1.73M2 — SIGNIFICANT CHANGE UP
EOSINOPHIL # BLD AUTO: 0.09 K/UL — SIGNIFICANT CHANGE UP (ref 0–0.5)
EOSINOPHIL NFR BLD AUTO: 0.9 % — SIGNIFICANT CHANGE UP (ref 0–6)
GLUCOSE SERPL-MCNC: 89 MG/DL — SIGNIFICANT CHANGE UP (ref 70–99)
GLUCOSE UR QL: NEGATIVE MG/DL — SIGNIFICANT CHANGE UP
HCG SERPL-ACNC: 946.9 MIU/ML — SIGNIFICANT CHANGE UP
HCT VFR BLD CALC: 33.4 % — LOW (ref 34.5–45)
HGB BLD-MCNC: 11.4 G/DL — LOW (ref 11.5–15.5)
IANC: 7.96 K/UL — HIGH (ref 1.8–7.4)
IMM GRANULOCYTES NFR BLD AUTO: 0.3 % — SIGNIFICANT CHANGE UP (ref 0–0.9)
KETONES UR QL: 40 MG/DL
LEUKOCYTE ESTERASE UR-ACNC: NEGATIVE — SIGNIFICANT CHANGE UP
LYMPHOCYTES # BLD AUTO: 1.4 K/UL — SIGNIFICANT CHANGE UP (ref 1–3.3)
LYMPHOCYTES # BLD AUTO: 13.5 % — SIGNIFICANT CHANGE UP (ref 13–44)
MCHC RBC-ENTMCNC: 28.2 PG — SIGNIFICANT CHANGE UP (ref 27–34)
MCHC RBC-ENTMCNC: 34.1 G/DL — SIGNIFICANT CHANGE UP (ref 32–36)
MCV RBC AUTO: 82.7 FL — SIGNIFICANT CHANGE UP (ref 80–100)
MONOCYTES # BLD AUTO: 0.81 K/UL — SIGNIFICANT CHANGE UP (ref 0–0.9)
MONOCYTES NFR BLD AUTO: 7.8 % — SIGNIFICANT CHANGE UP (ref 2–14)
NEUTROPHILS # BLD AUTO: 7.96 K/UL — HIGH (ref 1.8–7.4)
NEUTROPHILS NFR BLD AUTO: 76.9 % — SIGNIFICANT CHANGE UP (ref 43–77)
NITRITE UR-MCNC: NEGATIVE — SIGNIFICANT CHANGE UP
NRBC # BLD AUTO: 0 K/UL — SIGNIFICANT CHANGE UP (ref 0–0)
NRBC # FLD: 0 K/UL — SIGNIFICANT CHANGE UP (ref 0–0)
NRBC BLD AUTO-RTO: 0 /100 WBCS — SIGNIFICANT CHANGE UP (ref 0–0)
PH UR: 6 — SIGNIFICANT CHANGE UP (ref 5–8)
PLATELET # BLD AUTO: 327 K/UL — SIGNIFICANT CHANGE UP (ref 150–400)
POTASSIUM SERPL-MCNC: 3.8 MMOL/L — SIGNIFICANT CHANGE UP (ref 3.5–5.3)
POTASSIUM SERPL-SCNC: 3.8 MMOL/L — SIGNIFICANT CHANGE UP (ref 3.5–5.3)
PROT SERPL-MCNC: 6.9 G/DL — SIGNIFICANT CHANGE UP (ref 6–8.3)
PROT UR-MCNC: NEGATIVE MG/DL — SIGNIFICANT CHANGE UP
RBC # BLD: 4.04 M/UL — SIGNIFICANT CHANGE UP (ref 3.8–5.2)
RBC # FLD: 13.6 % — SIGNIFICANT CHANGE UP (ref 10.3–14.5)
RBC CASTS # UR COMP ASSIST: SIGNIFICANT CHANGE UP /HPF (ref 0–4)
SODIUM SERPL-SCNC: 138 MMOL/L — SIGNIFICANT CHANGE UP (ref 135–145)
SP GR SPEC: 1.01 — SIGNIFICANT CHANGE UP (ref 1–1.03)
UROBILINOGEN FLD QL: 0.2 MG/DL — SIGNIFICANT CHANGE UP (ref 0.2–1)
WBC # BLD: 10.35 K/UL — SIGNIFICANT CHANGE UP (ref 3.8–10.5)
WBC # FLD AUTO: 10.35 K/UL — SIGNIFICANT CHANGE UP (ref 3.8–10.5)
WBC UR QL: 0 /HPF — SIGNIFICANT CHANGE UP (ref 0–5)

## 2025-06-16 PROCEDURE — 99284 EMERGENCY DEPT VISIT MOD MDM: CPT

## 2025-06-16 PROCEDURE — 76817 TRANSVAGINAL US OBSTETRIC: CPT | Mod: 26

## 2025-06-16 RX ORDER — KETOROLAC TROMETHAMINE 30 MG/ML
15 INJECTION, SOLUTION INTRAMUSCULAR; INTRAVENOUS ONCE
Refills: 0 | Status: DISCONTINUED | OUTPATIENT
Start: 2025-06-16 | End: 2025-06-16

## 2025-06-16 RX ORDER — ACETAMINOPHEN 500 MG/5ML
1000 LIQUID (ML) ORAL ONCE
Refills: 0 | Status: COMPLETED | OUTPATIENT
Start: 2025-06-16 | End: 2025-06-16

## 2025-06-16 RX ADMIN — Medication 400 MILLIGRAM(S): at 17:51

## 2025-06-16 RX ADMIN — KETOROLAC TROMETHAMINE 15 MILLIGRAM(S): 30 INJECTION, SOLUTION INTRAMUSCULAR; INTRAVENOUS at 20:26

## 2025-06-16 RX ADMIN — Medication 1000 MILLILITER(S): at 17:34

## 2025-06-16 NOTE — ED PROVIDER NOTE - CLINICAL SUMMARY MEDICAL DECISION MAKING FREE TEXT BOX
Derek Fuentes, DO: 29 yo f approx 6 w preg, pw vb. PW family bedside provides collateral.  Patient here for repeat beta.  Patient reports increasing VB.  Has passed 2-3 pads since approximately 1300 today.  Reports mild, pelvic cramping.  Denies N/V/E, cough, congestion, recent travel, trauma.  Patient arrives HDS well-appearing, neurovasc intact, PE as noted.  Will check labs, imaging, obtain OB reassessment, reassess.

## 2025-06-16 NOTE — ED PROVIDER NOTE - PHYSICAL EXAMINATION
General: nad  HEENT: ncat  Neck: trachea ml  CV: well perfused   Resp: non labored breathing  Abd: non-distended, soft, mild abd ttp  : no CVA tenderness  MSK: full range of motion, no cyanosis, no edema, no clubbing, no immobility  Neuro: CN II-XII grossly intact, muscle strength 5/5 in all extremities, normal gait  Skin: no rashes, skin intact

## 2025-06-16 NOTE — ED ADULT NURSE NOTE - OBJECTIVE STATEMENT
Patient received in intake room 8. Patient A&Ox3 and ambulatory at baseline. Patient presents to the ED c/o pelvic pain, vaginal bleeding with pregnancy. phx approximately 6 weeks pregnant, , LMP 5/2. Pt states she was seen at [facility] ED for similar complaint and advised of possible ectopic pregnancy. Pt states vaginal bleeding started today. Patient denies headache, dizziness, lightheadedness, nausea/vomiting, fever/chills. Patient offers no complaints at this time. Respirations even and unlabored, no signs/symptoms of acute distress; patient denies dyspnea, shortness of breath, and chest pain. 20G IV placed to right AC, labs collected and sent, patient is stable at this time. Steady gait observed.

## 2025-06-16 NOTE — ED ADULT TRIAGE NOTE - CHIEF COMPLAINT QUOTE
pt c/o vaginal bleeding with b/l lower pelvic and lower back pain. states she was seen here on friday for low HCG. changing pads hourly over past 3 hours.  LMP 5/2

## 2025-06-16 NOTE — ED ADULT NURSE NOTE - NSFALLUNIVINTERV_ED_ALL_ED
Refills denied.  Pt is overdue for an appt last seen 4/2016   Bed/Stretcher in lowest position, wheels locked, appropriate side rails in place/Call bell, personal items and telephone in reach/Instruct patient to call for assistance before getting out of bed/chair/stretcher/Non-slip footwear applied when patient is off stretcher/[place] to call system/Physically safe environment - no spills, clutter or unnecessary equipment/Purposeful proactive rounding/Room/bathroom lighting operational, light cord in reach

## 2025-06-16 NOTE — CHART NOTE - NSCHARTNOTEFT_GEN_A_CORE
Patient called to follow up regarding patient's bHCG. Patient was advised earlier to return to the ED for repeat bHCG on 6/15, however patient did not come. Called patient today to remind her of the importance of follow up with no answer. Voicemail left.     Patient to remain on bHCG list.     Cookie Enciso, PGY-1

## 2025-06-16 NOTE — CONSULT NOTE ADULT - SUBJECTIVE AND OBJECTIVE BOX
ELOINA WISDOM  30y  Female [id number]    HPI: 30y  at 6w3d (LMP 25) sent in from OB office to rule out ectopic pregnancy due patient with inappropriate bHCG trend on . She was placed on beta list and called today for follow up HCG and pelvic sono. Patient did not respond to call. Came to the ED due to pelvic pain and vaginal bleeding that started today at around 1p. She states that she has been spotting for the last day and a half. She noticed increased pain today in the afternoon and then had heavier vaginal bleeding, saturating a pad per hour from 1pm-5m. Had some small clots, but she believes that she passed tissue like substance in the ED bathroom and threw it out. Denies lightheadedness or dizziness. No fever or chills. States pelvic pain is a 7/10 sharp pain in her lower abdomen that radiates to her back. Denies urinary sxs.   BHCG Trend per patient and chart: bHCG trend: 21 () -> 240 () -> 903 () at outpatient [facility] lab. 1002 ()-> 1416 ()- OSH->1212 ()-[facility], 946  ()-[facility]    OBGYN: Dr. Mariana Iglesias    – OBHx:   – GynHx: Severe menstrual cramps (suspected endometriosis). States menstrual days fluctuate between 29-32 days, no intermenstrual bleeding. States heavier periods that last 7 days. Hx of Fibroids. Denies prior diagnoses of ovarian cysts, Hx of abnormal pap smears, Hx of STIs  – PMH: Childhood asthma. Denies HTN, DM, cardiac disease, thyroid disorders, Hx of blood clots, bleeding disorders  – PSH: Denies  – Social: denies EtOH, smoking, recreational drug use  – Meds: PNV  – Allergies: NKDA      Vital Signs Last 24 Hrs  T(C): 36.7 (2025 14:05), Max: 36.7 (2025 14:05)  T(F): 98.1 (2025 14:05), Max: 98.1 (2025 14:05)  HR: 100 (2025 14:05) (100 - 100)  BP: 125/82 (2025 14:05) (125/82 - 125/82)  BP(mean): --  RR: 18 (2025 14:05) (18 - 18)  SpO2: 99% (2025 14:05) (99% - 99%)    Parameters below as of 2025 14:05  Patient On (Oxygen Delivery Method): room air      PE  Gen: NAD  Lungs: breathing comfortably on RA  Abdomen: Soft, 7/10 mildly sharp pain in the lower abdomen. No rebound or guarding.   Speculum: Cervix wnl, not visibly open, no active bleeding  Bimanual: No CMT  LE: no BL calf tenderness       LABS:                              11.4   10.35 )-----------( 327      ( 2025 17:41 )             33.4     06-16    138  |  104  |  9   ----------------------------<  89  3.8   |  21[L]  |  0.53    Ca    8.5      2025 17:40    TPro  6.9  /  Alb  4.2  /  TBili  1.6[H]  /  DBili  x   /  AST  16  /  ALT  16  /  AlkPhos  29[L]  06-16    I&O's Detail      Urinalysis Basic - ( 2025 17:40 )    Color: x / Appearance: x / SG: x / pH: x  Gluc: 89 mg/dL / Ketone: x  / Bili: x / Urobili: x   Blood: x / Protein: x / Nitrite: x   Leuk Esterase: x / RBC: x / WBC x   Sq Epi: x / Non Sq Epi: x / Bacteria: x        RADIOLOGY & ADDITIONAL STUDIES:     ELOINA WISDOM  30y  Female [id number]    HPI: 30y  at 6w3d (LMP 25) sent in from OB office to rule out ectopic pregnancy due patient with inappropriate bHCG trend on . She was placed on beta list and called today for follow up HCG and pelvic sono. Patient did not respond to call. Came to the ED due to pelvic pain and vaginal bleeding that started today at around 1p. She states that she has been spotting for the last day and a half. She noticed increased pain today in the afternoon and then had heavier vaginal bleeding, saturating a pad per hour from 1pm-5pm. Had some small clots, but she believes that she passed tissue like substance in the ED bathroom and threw it out. Denies lightheadedness or dizziness. No fever or chills. States pelvic pain is a 7/10 sharp pain in her lower abdomen that radiates to her back. Denies urinary sxs.   BHCG Trend per patient and chart: bHCG trend: 21 () -> 240 () -> 903 () at outpatient [facility] lab. 1002 ()-> 1416 ()- OSH->1212 ()-[facility] Lab, 946  ()-[facility]    OBGYN: Dr. Mariana Iglesias    – OBHx:   – GynHx: Severe menstrual cramps (suspected endometriosis). States menstrual days fluctuate between 29-32 days, no intermenstrual bleeding. States heavier periods that last 7 days. Hx of Fibroids. Denies prior diagnoses of ovarian cysts, Hx of abnormal pap smears, Hx of STIs  – PMH: Childhood asthma. Denies HTN, DM, cardiac disease, thyroid disorders, Hx of blood clots, bleeding disorders  – PSH: Denies  – Social: denies EtOH, smoking, recreational drug use  – Meds: PNV  – Allergies: NKDA      Vital Signs Last 24 Hrs  T(C): 36.7 (2025 14:05), Max: 36.7 (2025 14:05)  T(F): 98.1 (2025 14:05), Max: 98.1 (2025 14:05)  HR: 100 (2025 14:05) (100 - 100)  BP: 125/82 (2025 14:05) (125/82 - 125/82)  BP(mean): --  RR: 18 (2025 14:05) (18 - 18)  SpO2: 99% (2025 14:05) (99% - 99%)    Parameters below as of 2025 14:05  Patient On (Oxygen Delivery Method): room air      PE  Gen: NAD  Lungs: breathing comfortably on RA  Abdomen: Soft, 7/10 mildly sharp pain in the lower abdomen. No rebound or guarding.   Speculum: Cervix wnl, not visibly open, no active bleeding  Bimanual: No CMT  LE: no BL calf tenderness       LABS:                              11.4   10.35 )-----------( 327      ( 2025 17:41 )             33.4     06-16    138  |  104  |  9   ----------------------------<  89  3.8   |  21[L]  |  0.53    Ca    8.5      2025 17:40    TPro  6.9  /  Alb  4.2  /  TBili  1.6[H]  /  DBili  x   /  AST  16  /  ALT  16  /  AlkPhos  29[L]  06-16    I&O's Detail      Urinalysis Basic - ( 2025 17:40 )    Color: x / Appearance: x / SG: x / pH: x  Gluc: 89 mg/dL / Ketone: x  / Bili: x / Urobili: x   Blood: x / Protein: x / Nitrite: x   Leuk Esterase: x / RBC: x / WBC x   Sq Epi: x / Non Sq Epi: x / Bacteria: x        RADIOLOGY & ADDITIONAL STUDIES:

## 2025-06-16 NOTE — CONSULT NOTE ADULT - ASSESSMENT
30y  at 6w3d (LMP 25) sent in from OB office to rule out ectopic pregnancy due patient with inappropriate bHCG trend on . She was placed on beta list and called today for follow up HCG and pelvic sono. Patient did not respond to call. Came to the ED due to pelvic pain and vaginal bleeding that started today at around 1p. She states that she has been spotting for the last day and a half. She noticed increased pain today in the afternoon and then had heavier vaginal bleeding, saturating a pad per hour from 1pm-5m. Patient clinically stable with VSS, Hgb 10 and downtrending bHCG. Pt with no active bleeding on speculum but she endorses passing tissue like material earlier in the ED. Plan to get TVUS and f/u imaging; pt aware of concern for possible ectopic vs SAB vs anembryonic gestation.   -Continue with Pad counts.  -f/u TVUS  -BHCG Trend per patient and chart: bHCG trend: 21 () -> 240 () -> 903 () at outpatient [facility] lab. 1002 ()-> 1416 ()- OSH->1212 ()-[facility], 946 ()-[facility].  -Pt NPO since 9am, pt aware to not eat anything.     [facility] PGY3  shivam Zapata    Incomplete Note until lab values and US results are back with gyn attending attestation  30y  at 6w3d (LMP 25) sent in from OB office to rule out ectopic pregnancy due patient with inappropriate bHCG trend on . She was placed on beta list and called today for follow up HCG and pelvic sono. Patient did not respond to call. Came to the ED due to pelvic pain and vaginal bleeding that started today at around 1p. She states that she has been spotting for the last day and a half. She noticed increased pain today in the afternoon and then had heavier vaginal bleeding, saturating a pad per hour from 1pm-5m. Patient clinically stable with VSS, Hgb 10 and downtrending bHCG. Pt with no active bleeding on speculum but she endorses passing tissue like material earlier in the ED. Plan to get TVUS and f/u imaging; pt aware of concern for possible ectopic vs SAB vs anembryonic gestation.   -Continue with Pad counts.  -f/u TVUS  -BHCG Trend per patient and chart: bHCG trend: 21 () -> 240 () -> 903 () at outpatient [facility] lab. 1002 ()-> 1416 ()- OSH->1212 ()-[facility], 946 ()-[facility].  -Pt NPO since 9am, pt aware to not eat anything.     [facility] PGY3  shivam Zapata    Incomplete Note until lab values and US results are back with gyn attending attestation       R3 Addendum 745p:  bHCG noted to be 946, continuing to downtrend.    D/w Dr. Zapata  [facility] PGY3    INCOMPLETE 30y  at 6w3d (LMP 25) sent in from OB office to rule out ectopic pregnancy due patient with inappropriate bHCG trend on . She was placed on beta list and called today for follow up HCG and pelvic sono. Patient did not respond to call. Came to the ED due to pelvic pain and vaginal bleeding that started today at around 1p. She states that she has been spotting for the last day and a half. She noticed increased pain today in the afternoon and then had heavier vaginal bleeding, saturating a pad per hour from 1pm-5m. Patient clinically stable with VSS, Hgb 10 and downtrending bHCG. Pt with no active bleeding on speculum but she endorses passing tissue like material earlier in the ED. Plan to get TVUS and f/u imaging; pt aware of concern for possible ectopic vs SAB vs anembryonic gestation.   -Continue with Pad counts.  -f/u TVUS  -BHCG Trend per patient and chart: bHCG trend: 21 () -> 240 () -> 903 () at outpatient [facility] lab. 1002 ()-> 1416 ()- OSH->1212 ()-[facility], 946 ()-[facility].  -Pt NPO since 9am, pt aware to not eat anything.     [facility] PGY3  dw Dr. Zapata    Incomplete Note until lab values and US results are back with gyn attending attestation     R3 Addendum 745p:  bHCG noted to be 946, continuing to downtrend. TVUS demonstrating no evidence of intrauterine gestation or adnexal mass. Previously seen fluid collection in the endometrium is not currently identified. RO corpus luteum. LO wnl. No FF. Pt afebrile and hemodynamically stable. Clinical presentation likely consistent with SAB in process r/o ectopic pregnancy. Will continue to follow until bHCG is negative.    -patient to follow up in 1w for repeat bHCG in ED  -Rh type: AB+. No need for rhogam at this time.   -Ectopic precautions reviewed with patient.  Discussed with patient importance of follow up for b-HCG given unknown pregnancy location at this time.  Patient expressed understanding.  All questions and concerns addressed to patient's apparent satisfaction.   -patient to be added to GYN b-HCG list for closer follow up.    -patient stable for d/c home from GYN perspective.  Primary management per ED team.       D/w Dr. Jodi Garcia PGY3

## 2025-06-16 NOTE — ED PROVIDER NOTE - PROGRESS NOTE DETAILS
Derek Fuentes DO: Patient reassessed, NAD, non-toxic appearing. results dw pt/family, questions answered. improved sx. ob recs appreciated. dc w/ strict return precautions.

## 2025-06-16 NOTE — ED PROVIDER NOTE - NSFOLLOWUPINSTRUCTIONS_ED_ALL_ED_FT
1) Please follow up with your Primary Care Provider in 24-48 hours  2) Seek immediate medical care for any new or returning symptoms including but not limited severe pain, high fevers, bleeding more than one pad per hour, lightheadedness  3) Take Tylenol 650 mg every 4-6 hours as needed for pain. Do not take more than 2 grams within a 24 hour period  4) Take Ibuprofen 400-600 mg every 4-6 hours as needed for pain. Do not take more than 1200 mg within a 24 hour period. Take this medication with food

## 2025-06-16 NOTE — ED PROVIDER NOTE - PATIENT PORTAL LINK FT
You can access the FollowMyHealth Patient Portal offered by [facility] by registering at the following website: [email or web address]. By joining [facility]’s FollowMyHealth portal, you will also be able to view your health information using other applications (apps) compatible with our system.

## 2025-06-16 NOTE — ED ADULT TRIAGE NOTE - HEART RATE (BEATS/MIN)
From: Jan Hernandez  To: Gildardo Smith MD  Sent: 11/29/2017 4:03 PM CST  Subject: Service To: Nutrition Consult    Franky Smith.     I would like to meet with Li Jennings, one of our hospital based outpatient dietitians at [facility] to create a nutrition plan that will work for me and my family. Currently, what works for me doesn't work for everyone else in my family.    I called Li and she said that she will need a Service To: Nutrition Consult order. She said that it should be tied to a medical diagnosis for me that will be covered by [employer] insurance as appropriate.    Once you enter the order, she will contact me and we'll schedule and appointment.    Hope all is well.    Jake Hernandez   100

## 2025-06-23 NOTE — CHART NOTE - NSCHARTNOTEFT_GEN_A_CORE
Spoke to patient regarding repeat bHCG. Patient reports that she made an appointment with outpatient GYN Claudia Bateman. Patient has appointment today at 2p. Denies fever, chills, nausea, vomiting, or heavy vaginal bleeding. Care as per private OBGYN.     Evette Uribe PGY1

## 2025-06-24 NOTE — CHART NOTE - NSCHARTNOTEFT_GEN_A_CORE
Called and spoke with patient, reports was seen by office of Dr. Carver yesterday, bHCG was reportedly 36. Patient will continue to follow up with private OBGYN. Emphasized ED return precautions.    BAKARI Agudelo  d/w GYN team